# Patient Record
(demographics unavailable — no encounter records)

---

## 2025-05-02 NOTE — PHYSICAL EXAM
[FreeTextEntry1] : Examination today reveals that he has with the splint removed obvious deformity to the junction of the mid and distal thirds of the radius compartments are soft elbow is nontender neurovascular status is intact.  Review of x-rays outside [facility] April 30, 2025 left forearm reveals a displaced fracture of the distal radial shaft

## 2025-05-02 NOTE — HISTORY OF PRESENT ILLNESS
[FreeTextEntry1] : This 10-year-old healthy child with normal development is seen today for evaluation of the left upper extremity.  He was well until 2 days ago when he fell while playing.  This precipitated significant pain swelling and poor motion to his wrist and hand.  He was seen at [facility] and sent out in a volar splint.  Past history is otherwise unremarkable

## 2025-05-02 NOTE — PROCEDURE
[] : left long arm cast [de-identified] : After appropriate timeout and consent was performed.  A hematoma block was then performed at the radial fracture site from a dorsal approach with a Betadine prep using a 21-gauge needle approximately 8 cc of 1% lidocaine was injected into the hematoma site.  Standard Band-Aid applied including procedure this was tolerated without incident

## 2025-05-02 NOTE — CONSULT LETTER
[Dear  ___] : Dear  [unfilled], [Consult Letter:] : I had the pleasure of evaluating your patient, [unfilled]. [Please see my note below.] : Please see my note below. [Consult Closing:] : Thank you very much for allowing me to participate in the care of this patient.  If you have any questions, please do not hesitate to contact me. [Sincerely,] : Sincerely, [FreeTextEntry3] : Dr Cole Estevez JR.

## 2025-05-02 NOTE — ASSESSMENT
[FreeTextEntry1] : Impression: Displaced fracture left distal radius.  This patient following a hematoma block underwent closed reduction manipulation of the distal radius fracture was performed followed by application of a molded long-arm fiberglass cast this was tolerated without incident.  Postreduction x-rays of the left wrist reveal satisfactory alignment for age of the distal radius fracture.  I discussed cast care and activities with mother no gym/sports until further notice a return in 1 week with x-rays of the left wrist

## 2025-05-08 NOTE — ASSESSMENT
[FreeTextEntry1] : Impression: Fracture left distal radius and ulna.  Continue immobilization return 10 days at which time he will be converted to a short arm cast followed by x-ray examination of the wrist

## 2025-05-08 NOTE — HISTORY OF PRESENT ILLNESS
[FreeTextEntry1] : This 10-year-old returns for follow-up of his left wrist fracture he is very comfortable in his cast no complaints

## 2025-05-08 NOTE — PHYSICAL EXAM
[FreeTextEntry1] : Exam today cast fits well no swelling moving his fingers well neurovascular status is intact.  X-rays ordered taken today 3 views of the left wrist reviewed interpreted by myself no change in overall satisfactory alignment

## 2025-05-20 NOTE — HISTORY OF PRESENT ILLNESS
[FreeTextEntry1] : This 10-year-old returns for follow-up of his left forearm fracture.  He is comfortable no complaints

## 2025-05-20 NOTE — ASSESSMENT
[FreeTextEntry1] : Impression: Fracture left distal radius.  The long-arm cast was removed and a well-padded molded fiberglass short arm cast applied uneventfully.  He will return in 3 weeks with x-rays of the left wrist and removal of the cast at that time

## 2025-05-20 NOTE — PHYSICAL EXAM
[FreeTextEntry1] : On examination today his cast fits appropriately no foul smell no swelling moving his fingers well neurovascular status is intact.  The long-arm cast was replaced by a molded short arm fiberglass cast uneventfully.  Post cast change x-rays 3 views of the left wrist reveal satisfactory alignment and ongoing healing of the distal radius fracture

## 2025-06-10 NOTE — PHYSICAL EXAM
[FreeTextEntry1] : Examination today well-fitting cast is noted no foul smell no swelling moving his fingers well neurovascular status is intact.  X-rays ordered and taken today left wrist 3 views reviewed interpreted by myself reveal satisfactory alignment unchanged progressive healing of the distal radius fracture

## 2025-06-10 NOTE — HISTORY OF PRESENT ILLNESS
[FreeTextEntry1] : This 10-year-old returns for follow-up of his left distal radius fracture.  Comfortable in the cast no complaints

## 2025-06-10 NOTE — ASSESSMENT
[FreeTextEntry1] : Impression: Fracture left distal radius.  The cast has been removed no local tenderness noted.  To begin motion exercises mom has been made aware there is a significant risk of refracture so light swimming only no playground activities that would predispose to falling I will see him in 4 weeks with x-rays of the left wrist

## 2025-07-08 NOTE — ASSESSMENT
[FreeTextEntry1] : Impression: Fracture left distal radius and ulna.  I have advised mom he would be careful in the playground avoiding trampolines monkey bars for another month or so to allow for further maturation of his fracture site.  I will see him in 1 year with x-rays of the wrist to evaluate for progressive remodeling

## 2025-07-08 NOTE — HISTORY OF PRESENT ILLNESS
[FreeTextEntry1] : This 10-year-old returns for follow-up of his left upper extremity doing well no complaints whatsoever

## 2025-07-08 NOTE — PHYSICAL EXAM
[FreeTextEntry1] : On exam he has excellent motion to the elbow forearm wrist and hand at all levels no deficit strength is good he has no tenderness  X-rays were taken today left wrist 3 views reviewed interpreted by myself healed distal radius and ulna fractures with mild horizontal translation of the radial fracture as noted previously